# Patient Record
Sex: FEMALE | Race: BLACK OR AFRICAN AMERICAN | NOT HISPANIC OR LATINO | URBAN - METROPOLITAN AREA
[De-identification: names, ages, dates, MRNs, and addresses within clinical notes are randomized per-mention and may not be internally consistent; named-entity substitution may affect disease eponyms.]

---
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
## 2019-02-06 ENCOUNTER — EMERGENCY (EMERGENCY)
Facility: HOSPITAL | Age: 19
LOS: 1 days | Discharge: HOME | End: 2019-02-06
Attending: EMERGENCY MEDICINE | Admitting: EMERGENCY MEDICINE
Payer: MEDICAID

## 2019-02-06 VITALS
TEMPERATURE: 99 F | RESPIRATION RATE: 18 BRPM | HEART RATE: 104 BPM | DIASTOLIC BLOOD PRESSURE: 89 MMHG | OXYGEN SATURATION: 100 % | SYSTOLIC BLOOD PRESSURE: 112 MMHG

## 2019-02-06 VITALS
TEMPERATURE: 98 F | DIASTOLIC BLOOD PRESSURE: 66 MMHG | OXYGEN SATURATION: 100 % | HEART RATE: 86 BPM | SYSTOLIC BLOOD PRESSURE: 110 MMHG | RESPIRATION RATE: 18 BRPM

## 2019-02-06 PROBLEM — F31.4 BIPOLAR DISORDER, CURRENT EPISODE DEPRESSED, SEVERE, WITHOUT PSYCHOTIC FEATURES: Chronic | Status: ACTIVE | Noted: 2017-09-14

## 2019-02-06 LAB
ALBUMIN SERPL ELPH-MCNC: 3.4 G/DL — SIGNIFICANT CHANGE UP (ref 3.3–5)
ALP SERPL-CCNC: 71 U/L — SIGNIFICANT CHANGE UP (ref 40–120)
ALT FLD-CCNC: 8 U/L — SIGNIFICANT CHANGE UP (ref 4–33)
ANION GAP SERPL CALC-SCNC: 11 MMO/L — SIGNIFICANT CHANGE UP (ref 7–14)
ANISOCYTOSIS BLD QL: SLIGHT — SIGNIFICANT CHANGE UP
AST SERPL-CCNC: 12 U/L — SIGNIFICANT CHANGE UP (ref 4–32)
BASOPHILS # BLD AUTO: 0.03 K/UL — SIGNIFICANT CHANGE UP (ref 0–0.2)
BASOPHILS NFR BLD AUTO: 0.3 % — SIGNIFICANT CHANGE UP (ref 0–2)
BASOPHILS NFR SPEC: 0.9 % — SIGNIFICANT CHANGE UP (ref 0–2)
BILIRUB SERPL-MCNC: < 0.2 MG/DL — LOW (ref 0.2–1.2)
BLASTS # FLD: 0 % — SIGNIFICANT CHANGE UP (ref 0–0)
BUN SERPL-MCNC: 9 MG/DL — SIGNIFICANT CHANGE UP (ref 7–23)
CALCIUM SERPL-MCNC: 9.1 MG/DL — SIGNIFICANT CHANGE UP (ref 8.4–10.5)
CHLORIDE SERPL-SCNC: 103 MMOL/L — SIGNIFICANT CHANGE UP (ref 98–107)
CO2 SERPL-SCNC: 24 MMOL/L — SIGNIFICANT CHANGE UP (ref 22–31)
CREAT SERPL-MCNC: 0.66 MG/DL — SIGNIFICANT CHANGE UP (ref 0.5–1.3)
EOSINOPHIL # BLD AUTO: 0.13 K/UL — SIGNIFICANT CHANGE UP (ref 0–0.5)
EOSINOPHIL NFR BLD AUTO: 1.3 % — SIGNIFICANT CHANGE UP (ref 0–6)
EOSINOPHIL NFR FLD: 0 % — SIGNIFICANT CHANGE UP (ref 0–6)
GIANT PLATELETS BLD QL SMEAR: PRESENT — SIGNIFICANT CHANGE UP
GLUCOSE SERPL-MCNC: 110 MG/DL — HIGH (ref 70–99)
HCT VFR BLD CALC: 23.8 % — LOW (ref 34.5–45)
HGB BLD-MCNC: 7.3 G/DL — LOW (ref 11.5–15.5)
HYPOCHROMIA BLD QL: SIGNIFICANT CHANGE UP
IMM GRANULOCYTES NFR BLD AUTO: 0.5 % — SIGNIFICANT CHANGE UP (ref 0–1.5)
LIDOCAIN IGE QN: 46.7 U/L — SIGNIFICANT CHANGE UP (ref 7–60)
LYMPHOCYTES # BLD AUTO: 2.16 K/UL — SIGNIFICANT CHANGE UP (ref 1–3.3)
LYMPHOCYTES # BLD AUTO: 21.4 % — SIGNIFICANT CHANGE UP (ref 13–44)
LYMPHOCYTES NFR SPEC AUTO: 21.4 % — SIGNIFICANT CHANGE UP (ref 13–44)
MCHC RBC-ENTMCNC: 18.8 PG — LOW (ref 27–34)
MCHC RBC-ENTMCNC: 30.7 % — LOW (ref 32–36)
MCV RBC AUTO: 61.2 FL — LOW (ref 80–100)
METAMYELOCYTES # FLD: 0 % — SIGNIFICANT CHANGE UP (ref 0–1)
MICROCYTES BLD QL: SLIGHT — SIGNIFICANT CHANGE UP
MONOCYTES # BLD AUTO: 0.52 K/UL — SIGNIFICANT CHANGE UP (ref 0–0.9)
MONOCYTES NFR BLD AUTO: 5.1 % — SIGNIFICANT CHANGE UP (ref 2–14)
MONOCYTES NFR BLD: 0.9 % — LOW (ref 2–9)
MYELOCYTES NFR BLD: 0 % — SIGNIFICANT CHANGE UP (ref 0–0)
NEUTROPHIL AB SER-ACNC: 76.8 % — SIGNIFICANT CHANGE UP (ref 43–77)
NEUTROPHILS # BLD AUTO: 7.22 K/UL — SIGNIFICANT CHANGE UP (ref 1.8–7.4)
NEUTROPHILS NFR BLD AUTO: 71.4 % — SIGNIFICANT CHANGE UP (ref 43–77)
NEUTS BAND # BLD: 0 % — SIGNIFICANT CHANGE UP (ref 0–6)
NRBC # FLD: 0 K/UL — LOW (ref 25–125)
OB PNL STL: NEGATIVE — SIGNIFICANT CHANGE UP
OTHER - HEMATOLOGY %: 0 — SIGNIFICANT CHANGE UP
OVALOCYTES BLD QL SMEAR: SLIGHT — SIGNIFICANT CHANGE UP
PLATELET # BLD AUTO: 639 K/UL — HIGH (ref 150–400)
PLATELET COUNT - ESTIMATE: SIGNIFICANT CHANGE UP
PMV BLD: 9 FL — SIGNIFICANT CHANGE UP (ref 7–13)
POIKILOCYTOSIS BLD QL AUTO: SLIGHT — SIGNIFICANT CHANGE UP
POTASSIUM SERPL-MCNC: 3.8 MMOL/L — SIGNIFICANT CHANGE UP (ref 3.5–5.3)
POTASSIUM SERPL-SCNC: 3.8 MMOL/L — SIGNIFICANT CHANGE UP (ref 3.5–5.3)
PROMYELOCYTES # FLD: 0 % — SIGNIFICANT CHANGE UP (ref 0–0)
PROT SERPL-MCNC: 6.7 G/DL — SIGNIFICANT CHANGE UP (ref 6–8.3)
RBC # BLD: 3.89 M/UL — SIGNIFICANT CHANGE UP (ref 3.8–5.2)
RBC # FLD: 18.5 % — HIGH (ref 10.3–14.5)
SODIUM SERPL-SCNC: 138 MMOL/L — SIGNIFICANT CHANGE UP (ref 135–145)
TARGETS BLD QL SMEAR: SIGNIFICANT CHANGE UP
VARIANT LYMPHS # BLD: 0 % — SIGNIFICANT CHANGE UP
WBC # BLD: 10.11 K/UL — SIGNIFICANT CHANGE UP (ref 3.8–10.5)
WBC # FLD AUTO: 10.11 K/UL — SIGNIFICANT CHANGE UP (ref 3.8–10.5)

## 2019-02-06 PROCEDURE — 76705 ECHO EXAM OF ABDOMEN: CPT | Mod: 26

## 2019-02-06 PROCEDURE — 99284 EMERGENCY DEPT VISIT MOD MDM: CPT | Mod: 25

## 2019-02-06 RX ORDER — IBUPROFEN 200 MG
600 TABLET ORAL ONCE
Qty: 0 | Refills: 0 | Status: COMPLETED | OUTPATIENT
Start: 2019-02-06 | End: 2019-02-06

## 2019-02-06 RX ORDER — ONDANSETRON 8 MG/1
4 TABLET, FILM COATED ORAL ONCE
Qty: 0 | Refills: 0 | Status: COMPLETED | OUTPATIENT
Start: 2019-02-06 | End: 2019-02-06

## 2019-02-06 RX ORDER — FAMOTIDINE 10 MG/ML
20 INJECTION INTRAVENOUS ONCE
Qty: 0 | Refills: 0 | Status: COMPLETED | OUTPATIENT
Start: 2019-02-06 | End: 2019-02-06

## 2019-02-06 RX ADMIN — FAMOTIDINE 20 MILLIGRAM(S): 10 INJECTION INTRAVENOUS at 04:48

## 2019-02-06 RX ADMIN — Medication 600 MILLIGRAM(S): at 07:45

## 2019-02-06 RX ADMIN — ONDANSETRON 4 MILLIGRAM(S): 8 TABLET, FILM COATED ORAL at 04:48

## 2019-02-06 RX ADMIN — Medication 30 MILLILITER(S): at 04:48

## 2019-02-06 NOTE — ED PROVIDER NOTE - ATTENDING CONTRIBUTION TO CARE
HPI: 17 y/o F with history of depression presents with complaint of RUQ abdominal pain intermittently x one week. Pain currently 8/10, sharp, non radiating, no associated nausea/vomiting. Patient was evaluated at [facility] for same pain one week ago and had normal CT. Pain is not exacerbated or improved with eating. Denies fever, chills, chest pain, SOB, diarrhea, dysuria, increased frequency. LMP one week ago.  EXAM: uncomfortable appearing, no guarding but tenderness to palpation in epigastrium, Neg Nicholson, neg mcburneys, lungs ctab.   MDM: concern for gastritis vs PUD, unlikely gallstones or AAA or MI/ACS. Will obtain labs, imaging, provide meds and reassess. HPI: 19 y/o F with history of depression presents with complaint of RUQ abdominal pain intermittently x one week. Pain currently 8/10, sharp, non radiating, no associated nausea/vomiting. Patient was evaluated at [facility] for same pain one week ago and had normal CT. Pain is not exacerbated or improved with eating. Denies fever, chills, chest pain, SOB, diarrhea, dysuria, increased frequency. LMP one week ago. Pt denies SI/HI at this time. Reviewed due to chart review showing previous SI/s with OD of meds. But denies this time. No tylenol ingestions.   EXAM: uncomfortable appearing, no guarding but tenderness to palpation in epigastrium, Neg [place]ier, neg mcburneys, lungs ctab.   MDM: concern for gastritis vs PUD, unlikely gallstones or AAA or MI/ACS. Will obtain labs, imaging, provide meds and reassess.

## 2019-02-06 NOTE — ED ADULT NURSE NOTE - CHIEF COMPLAINT QUOTE
Pt BIBA c/o RUQ x 10 days.  went to OSH and was given ABX for UTI??  Pt with headphones listening to music.  PT NAD.  LMP 1 week ago.

## 2019-02-06 NOTE — ED PROVIDER NOTE - NSFOLLOWUPINSTRUCTIONS_ED_ALL_ED_FT
Follow up with your PMD within 48-72 hours.  Also recommend follow up with gastroenterology - referral list given. Rest, increase fluids. Take tylenol 650mg every 6 hours for pain or temp greater than 99.9. Take Pepcid 20mg 1 tab 2x/day for 1-2 days as needed for epigastric burning.  Eat bland, small meals as tolerated.  Worsening or continued fever, chills, weakness, nausea, vomiting, abdominal pain return to ER

## 2019-02-06 NOTE — ED PROVIDER NOTE - OBJECTIVE STATEMENT
19 y/o F with history of depression presents with complaint of RUQ abdominal pain intermittently x one week. Pain currently 8/10, sharp, non radiating, no associated nausea/vomiting. Patient was evaluated at [facility] for same pain one week ago and had normal CT. Pain is not exacerbated or improved with eating. Denies fever, chills, chest pain, SOB, diarrhea, dysuria, increased frequency. LMP one week ago.

## 2019-02-06 NOTE — ED PROVIDER NOTE - PROGRESS NOTE DETAILS
BALDERRAMA: labs shows Hb 7.7 which is lower than previous. Pt has Hb C trait already known. Will send guiaic to see if PUD or GIB. If neg then can discharge home if pain improved to f/u with PMD and GI MD. KEVIN BAINS - pt notes improvement of abdominal pain with motrin, abdomen soft, nontender, stable for dc with gi and pmd f/u

## 2019-02-06 NOTE — ED ADULT TRIAGE NOTE - CHIEF COMPLAINT QUOTE
Pt BIBA c/o RUQ x 10 days.  went to OSH and was given ABX for UTI??  PT NAD.  LMP 1 week ago. Pt BIBA c/o RUQ x 10 days.  went to OSH and was given ABX for UTI??  Pt with headphones listening to music.  PT NAD.  LMP 1 week ago.

## 2019-02-06 NOTE — ED PROVIDER NOTE - MEDICAL DECISION MAKING DETAILS
19 y/o F with RUQ abd pain intermittently x one week. tenderness in RUQ. concern for lenora. labs, US, pain control, reassess

## 2019-04-22 NOTE — DISCHARGE NOTE ADULT - PHYSICIAN SECTION COMPLETE
Called patient and she is trying to establish at a clinic closer to her home at the [facility]. She is requesting that I call her back later so she has some time to decide what she wants to do.   Yes

## 2019-06-04 NOTE — H&P ADULT - GEN GEN HX ROS MEA POS PC
10lb weight loss over the past month/weight loss/fatigue/chills

## 2020-12-17 NOTE — ED ADULT NURSE NOTE - NS PRO PASSIVE SMOKE EXP
Parent left  requesting a refill of Adderall XR 25 mg. Parent states this medication is working well for Luca.      The [place] ePDMP has been checked by me as your delegate and is consistent with our medication record in Epic.     Last Refill: 11/17/20    Last Medication Check: 9/15/20      Routed to PCP for review.    Unknown

## 2021-10-20 NOTE — PATIENT PROFILE ADULT. - SPIRITUAL CULTURAL, RELIGIOUS PRACTICES/VALUES, PROFILE
n/a Cosentyx Counseling:  I discussed with the patient the risks of Cosentyx including but not limited to worsening of Crohn's disease, immunosuppression, allergic reactions and infections.  The patient understands that monitoring is required including a PPD at baseline and must alert us or the primary physician if symptoms of infection or other concerning signs are noted.

## 2022-05-04 NOTE — ED ADULT NURSE NOTE - PT NEEDS ASSIST
Patient here today for nurse blood pressure check. Last dose of BP medication taken Amlodipine on 5/4 in the morning. BP Readings from Last 1 Encounters:   05/04/22 1429 (!) 156/78   05/04/22 1416 (!) 144/78       Last visit for this condition: 11/23/21  Last visit BP Reading 194/108  Per that visit plan of care: Lisinopril 20 mg daily  Next visit with PCP scheduled for: 5/16/22  Current blood pressure medications are: Lisinopril 20mg and Amlodipine 5mg    Please review and advise on plan of care. Pulse Readings from Last 1 Encounters:   04/26/22 85       Patient states he takes the Lisinopril in the evening and the Amlodipine in the morning. Patient denied any symptoms including headaches, dizzy, or lightheadedness. no

## 2022-06-01 NOTE — ED PROVIDER NOTE - CADM POA PRESS ULCER
Qbrexza Counseling:  I discussed with the patient the risks of Qbrexza including but not limited to headache, mydriasis, blurred vision, dry eyes, nasal dryness, dry mouth, dry throat, dry skin, urinary hesitation, and constipation.  Local skin reactions including erythema, burning, stinging, and itching can also occur. No

## 2022-09-13 ENCOUNTER — EMERGENCY (EMERGENCY)
Facility: HOSPITAL | Age: 22
LOS: 0 days | Discharge: HOME | End: 2022-09-13
Attending: STUDENT IN AN ORGANIZED HEALTH CARE EDUCATION/TRAINING PROGRAM

## 2022-09-13 VITALS — SYSTOLIC BLOOD PRESSURE: 112 MMHG | DIASTOLIC BLOOD PRESSURE: 82 MMHG | HEART RATE: 63 BPM

## 2022-09-13 VITALS — WEIGHT: 119.93 LBS | HEIGHT: 65 IN

## 2022-09-13 DIAGNOSIS — R19.7 DIARRHEA, UNSPECIFIED: ICD-10-CM

## 2022-09-13 DIAGNOSIS — R11.2 NAUSEA WITH VOMITING, UNSPECIFIED: ICD-10-CM

## 2022-09-13 DIAGNOSIS — R10.30 LOWER ABDOMINAL PAIN, UNSPECIFIED: ICD-10-CM

## 2022-09-13 DIAGNOSIS — R55 SYNCOPE AND COLLAPSE: ICD-10-CM

## 2022-09-13 LAB
ALBUMIN SERPL ELPH-MCNC: 3.6 G/DL — SIGNIFICANT CHANGE UP (ref 3.3–5)
ALP SERPL-CCNC: 70 U/L — SIGNIFICANT CHANGE UP (ref 40–120)
ALT FLD-CCNC: 16 U/L — SIGNIFICANT CHANGE UP (ref 12–78)
ANION GAP SERPL CALC-SCNC: 8 MMOL/L — SIGNIFICANT CHANGE UP (ref 5–17)
APPEARANCE UR: ABNORMAL
AST SERPL-CCNC: 30 U/L — SIGNIFICANT CHANGE UP (ref 15–37)
BACTERIA # UR AUTO: ABNORMAL
BASOPHILS # BLD AUTO: 0.02 K/UL — SIGNIFICANT CHANGE UP (ref 0–0.2)
BASOPHILS NFR BLD AUTO: 0.4 % — SIGNIFICANT CHANGE UP (ref 0–2)
BILIRUB SERPL-MCNC: 0.4 MG/DL — SIGNIFICANT CHANGE UP (ref 0.2–1.2)
BILIRUB UR-MCNC: NEGATIVE — SIGNIFICANT CHANGE UP
BUN SERPL-MCNC: 11 MG/DL — SIGNIFICANT CHANGE UP (ref 7–23)
CALCIUM SERPL-MCNC: 8.6 MG/DL — SIGNIFICANT CHANGE UP (ref 8.5–10.1)
CHLORIDE SERPL-SCNC: 110 MMOL/L — HIGH (ref 96–108)
CO2 SERPL-SCNC: 23 MMOL/L — SIGNIFICANT CHANGE UP (ref 22–31)
COLOR SPEC: YELLOW — SIGNIFICANT CHANGE UP
COMMENT - URINE: SIGNIFICANT CHANGE UP
CREAT SERPL-MCNC: 0.68 MG/DL — SIGNIFICANT CHANGE UP (ref 0.5–1.3)
DIFF PNL FLD: ABNORMAL
EGFR: 125 ML/MIN/1.73M2 — SIGNIFICANT CHANGE UP
EOSINOPHIL # BLD AUTO: 0.09 K/UL — SIGNIFICANT CHANGE UP (ref 0–0.5)
EOSINOPHIL NFR BLD AUTO: 1.9 % — SIGNIFICANT CHANGE UP (ref 0–6)
GLUCOSE SERPL-MCNC: 89 MG/DL — SIGNIFICANT CHANGE UP (ref 70–99)
GLUCOSE UR QL: NEGATIVE MG/DL — SIGNIFICANT CHANGE UP
HCG SERPL-ACNC: <1 MIU/ML — SIGNIFICANT CHANGE UP
HCT VFR BLD CALC: 29.5 % — LOW (ref 34.5–45)
HGB BLD-MCNC: 9.4 G/DL — LOW (ref 11.5–15.5)
HIV 1 & 2 AB SERPL IA.RAPID: SIGNIFICANT CHANGE UP
HYPOCHROMIA BLD QL: SLIGHT — SIGNIFICANT CHANGE UP
IMM GRANULOCYTES NFR BLD AUTO: 0.2 % — SIGNIFICANT CHANGE UP (ref 0–1.5)
KETONES UR-MCNC: ABNORMAL
LACTATE SERPL-SCNC: 1 MMOL/L — SIGNIFICANT CHANGE UP (ref 0.7–2)
LEUKOCYTE ESTERASE UR-ACNC: NEGATIVE — SIGNIFICANT CHANGE UP
LIDOCAIN IGE QN: 208 U/L — SIGNIFICANT CHANGE UP (ref 73–393)
LYMPHOCYTES # BLD AUTO: 1.77 K/UL — SIGNIFICANT CHANGE UP (ref 1–3.3)
LYMPHOCYTES # BLD AUTO: 38.2 % — SIGNIFICANT CHANGE UP (ref 13–44)
MANUAL SMEAR VERIFICATION: SIGNIFICANT CHANGE UP
MCHC RBC-ENTMCNC: 21.4 PG — LOW (ref 27–34)
MCHC RBC-ENTMCNC: 31.9 G/DL — LOW (ref 32–36)
MCV RBC AUTO: 67 FL — LOW (ref 80–100)
MICROCYTES BLD QL: SLIGHT — SIGNIFICANT CHANGE UP
MONOCYTES # BLD AUTO: 0.29 K/UL — SIGNIFICANT CHANGE UP (ref 0–0.9)
MONOCYTES NFR BLD AUTO: 6.3 % — SIGNIFICANT CHANGE UP (ref 2–14)
NEUTROPHILS # BLD AUTO: 2.45 K/UL — SIGNIFICANT CHANGE UP (ref 1.8–7.4)
NEUTROPHILS NFR BLD AUTO: 53 % — SIGNIFICANT CHANGE UP (ref 43–77)
NITRITE UR-MCNC: NEGATIVE — SIGNIFICANT CHANGE UP
NRBC # BLD: 0 /100 WBCS — SIGNIFICANT CHANGE UP (ref 0–0)
PH UR: 5 — SIGNIFICANT CHANGE UP (ref 5–8)
PLAT MORPH BLD: NORMAL — SIGNIFICANT CHANGE UP
PLATELET # BLD AUTO: 428 K/UL — HIGH (ref 150–400)
POTASSIUM SERPL-MCNC: 4.3 MMOL/L — SIGNIFICANT CHANGE UP (ref 3.5–5.3)
POTASSIUM SERPL-SCNC: 4.3 MMOL/L — SIGNIFICANT CHANGE UP (ref 3.5–5.3)
PROT SERPL-MCNC: 7.6 GM/DL — SIGNIFICANT CHANGE UP (ref 6–8.3)
PROT UR-MCNC: 30 MG/DL
RBC # BLD: 4.4 M/UL — SIGNIFICANT CHANGE UP (ref 3.8–5.2)
RBC # FLD: 20.1 % — HIGH (ref 10.3–14.5)
RBC BLD AUTO: SIGNIFICANT CHANGE UP
RBC CASTS # UR COMP ASSIST: ABNORMAL /HPF (ref 0–4)
SODIUM SERPL-SCNC: 141 MMOL/L — SIGNIFICANT CHANGE UP (ref 135–145)
SP GR SPEC: 1.02 — SIGNIFICANT CHANGE UP (ref 1.01–1.02)
TROPONIN I, HIGH SENSITIVITY RESULT: 4.1 NG/L — SIGNIFICANT CHANGE UP
UROBILINOGEN FLD QL: NEGATIVE MG/DL — SIGNIFICANT CHANGE UP
WBC # BLD: 4.63 K/UL — SIGNIFICANT CHANGE UP (ref 3.8–10.5)
WBC # FLD AUTO: 4.63 K/UL — SIGNIFICANT CHANGE UP (ref 3.8–10.5)
WBC UR QL: NEGATIVE — SIGNIFICANT CHANGE UP

## 2022-09-13 PROCEDURE — 71045 X-RAY EXAM CHEST 1 VIEW: CPT | Mod: 26

## 2022-09-13 PROCEDURE — 99285 EMERGENCY DEPT VISIT HI MDM: CPT

## 2022-09-13 PROCEDURE — 74177 CT ABD & PELVIS W/CONTRAST: CPT | Mod: 26,MC

## 2022-09-13 PROCEDURE — 70450 CT HEAD/BRAIN W/O DYE: CPT | Mod: 26,MC

## 2022-09-13 PROCEDURE — 93010 ELECTROCARDIOGRAM REPORT: CPT

## 2022-09-13 RX ORDER — SODIUM CHLORIDE 9 MG/ML
1000 INJECTION INTRAMUSCULAR; INTRAVENOUS; SUBCUTANEOUS ONCE
Refills: 0 | Status: COMPLETED | OUTPATIENT
Start: 2022-09-13 | End: 2022-09-13

## 2022-09-13 RX ORDER — SACCHAROMYCES BOULARDII 250 MG
1 POWDER IN PACKET (EA) ORAL
Qty: 5 | Refills: 0
Start: 2022-09-13 | End: 2022-09-17

## 2022-09-13 RX ORDER — ONDANSETRON 8 MG/1
4 TABLET, FILM COATED ORAL ONCE
Refills: 0 | Status: COMPLETED | OUTPATIENT
Start: 2022-09-13 | End: 2022-09-13

## 2022-09-13 RX ORDER — ONDANSETRON 8 MG/1
1 TABLET, FILM COATED ORAL
Qty: 10 | Refills: 0
Start: 2022-09-13 | End: 2022-09-17

## 2022-09-13 RX ORDER — FAMOTIDINE 10 MG/ML
20 INJECTION INTRAVENOUS ONCE
Refills: 0 | Status: COMPLETED | OUTPATIENT
Start: 2022-09-13 | End: 2022-09-13

## 2022-09-13 RX ADMIN — SODIUM CHLORIDE 1000 MILLILITER(S): 9 INJECTION INTRAMUSCULAR; INTRAVENOUS; SUBCUTANEOUS at 13:06

## 2022-09-13 RX ADMIN — ONDANSETRON 4 MILLIGRAM(S): 8 TABLET, FILM COATED ORAL at 13:06

## 2022-09-13 RX ADMIN — FAMOTIDINE 20 MILLIGRAM(S): 10 INJECTION INTRAVENOUS at 13:06

## 2022-09-13 NOTE — ED ADULT TRIAGE NOTE - CHIEF COMPLAINT QUOTE
pt a&O x4 ambulatory from home, pt c.o  diarrhea x 1 weeks. LMP now . pt c.o of lower abd pain states she had a unwitnessed syncopal episode ":fainted" last night at home.

## 2022-09-13 NOTE — ED PROVIDER NOTE - CLINICAL SUMMARY MEDICAL DECISION MAKING FREE TEXT BOX
Otherwise healthy 22F pw 1wk of N/V/D, lower abd pain a/w episode unwitnessed syncope this AM. AF, VSS. Well appearing, in NAD. Plan: CBC, CMP, lipase, lactate, HCG, UA/C, CT brain / AP, trop, ECG, CXR. Give IVF, Zofran, Pepcid. Re-eval.

## 2022-09-13 NOTE — ED ADULT NURSE NOTE - OBJECTIVE STATEMENT
Pt AOx4, responsive, ambulatory w/o assistive device. Pt c/o syncopal episode at 2am today and lost consciousness till 8am. States nausea & vomiting multiple episodes for days. Pt AOx4, responsive, ambulatory w/o assistive device. Pt c/o syncopal episode at 2am today and lost consciousness till 8am. States dizziness, nausea & vomiting and diarrhea multiple episodes for days. denies blood in vomit, fever/chills, SOB. no PMH per pt. NKA. LMP 9/13/22.

## 2022-09-13 NOTE — ED PROVIDER NOTE - PROGRESS NOTE DETAILS
W/u neg, stable for d/c, requesting STI testing, will send. W/u w/o significant abnormalities. On re-eval, resting comfortably, in NAD. Pt requesting STI testing, will send. Stable for d/c home. Given scripts for Zofran, Florastor. Instructed for close outpatient PCP f/u. Return signs / symptoms d/w pt. She understands / agrees w/ this plan.

## 2022-09-13 NOTE — ED PROVIDER NOTE - OBJECTIVE STATEMENT
22F presents d/t N/V/D, lower abd pain x 1wk a/w episode unwitnessed syncope last night. Accompanied by sister. Pt reports ate meal of chinese food 1wk ago, w/ onset N/V/D, thought 2/2 food poisoning. However symptoms have persisted, and pt reports now soiling herself w/ BMs. Pt went to [facility] 5 days ago and was told WBC was 'very low,' however pt left prior to completing ED w/u, w/o imaging studies. Pt reports on phone w/ friend at 0200, and believes she passed out b/c she awoke on the ground 0800 this AM. Pt reports friend told her he had heard 'thump' over the phone. Pt reports able to tolerate PO liquids, but vomiting w/ PO solids. Denies F/C, h/a, dizziness, CP, SOB, flank pain, black or bloody BMs, UTI sx, LE pain / swelling.     PMH none, PSH none, NKDA, no meds, LMP currently.

## 2022-09-13 NOTE — ED ADULT NURSE NOTE - NS TRANSFER PATIENT BELONGINGS
Cell Phone/PDA (specify)/Clothing

## 2022-09-13 NOTE — ED PROVIDER NOTE - PATIENT PORTAL LINK FT
Agree with Dr. Randall's recommendation You can access the FollowMyHealth Patient Portal offered by [facility] by registering at the following website: [email or web address]. By joining [facility]’s FollowMyHealth portal, you will also be able to view your health information using other applications (apps) compatible with our system. You can access the FollowMyHealth Patient Portal offered by [facility] by registering at the following website: [email or web address]. By joining [employer]’s FollowMyHealth portal, you will also be able to view your health information using other applications (apps) compatible with our system.

## 2022-09-13 NOTE — ED ADULT NURSE NOTE - NSICDXFAMILYHX_GEN_ALL_CORE_FT
FAMILY HISTORY:  Father  Still living? No  Family history of HIV infection, Age at diagnosis: Age Unknown    Mother  Still living? Yes, Estimated age: Age Unknown  Family history of essential hypertension, Age at diagnosis: Age Unknown     Patient : Rafael Stein Age: 55 year old Sex: male   MRN: [id number] Encounter Date: 2/5/2018      History     Chief Complaint   Patient presents with   • Numbness     HPI  2A/B02A  2/5/2018  5:47 PM Rafael Stein is a 55 year old male with a recent L shoulder surgery who was sent to the ED by his PCP for c/o left facial and LUE numbness that began yesterday. He noticed a L arm numbness in the morning and took a nap. When he woke up around 12:00 PM he noticed the facial numbness and his kids noticed a left facial droop. The pt's LKWT is 12:00 PM yesterday. He describes the facial numbness is on his forehead and the Left side of his face but bilateral forehead. The pt had a nerve release surgery 5 days ago on shoulder. He additionally has hx of cellulitis on his left arm. Additional sx include intermittent subjective fever that has been treated with Tylenol. His last Tylenol dose was around 1:00 PM. The pt denies weakness, pertinent vision changes or any other associated sx. There are no further complaints or modifying factors at this time.    PCP: Susan Albrecht MD     Allergies   Allergen Reactions   • Contrast Media Nausea & Vomiting     Nausea and vomiting is a sensitivity not a allergy. May need anti nausea meds   • Cortisone Other (See Comments)     Metallic taste in mouth for several weeks after injection        Prior to Admission Medications    ACETAMINOPHEN (TYLENOL) 650 MG CR TABLET    Take 650 mg by mouth every 8 hours as needed for Pain.    CEFADROXIL (DURICEF) 500 MG CAPSULE    Take 1 capsule by mouth 2 times daily.    DIPHENHYDRAMINE (BENADRYL) 25 MG CAPSULE    Take 1 capsule by mouth every 4 hours as needed for Itching.    FUROSEMIDE (LASIX) 20 MG TABLET    Take 1 tablet by mouth daily.    HYDROCODONE-ACETAMINOPHEN (NORCO) 5-325 MG PER TABLET    Take 1 tablet by mouth every 6 hours as needed for Pain.    LACTOBACILLUS ACIDOPHILUS (BACID)    Take 1 tablet by mouth 2 times daily.    LACTULOSE  (CHRONULAC) 10 GM/15ML SOLUTION    Take 15 mLs by mouth 3 times daily.    LIDOCAINE (LIDODERM) 5 % PATCH    Place 1 patch onto the skin nightly. Remove patch 12 hours after applying    MAGNESIUM LACTATE (MAG-TAB SR) 84 MG (7MEQ) TAB CR    Take 84 mg by mouth daily (with breakfast).     MENTHOL-METHYL SALICYLATE ANALGESIC (ICY HOT, MENTHODERM) OINTMENT OINTMENT    Apply topically every 8 hours as needed for Pain.    NICOTINE (NICODERM) 14 MG/24HR PATCH    Place 1 patch onto the skin daily.    PROPRANOLOL (INDERAL) 10 MG TABLET    Take 1 tablet by mouth 2 times daily.    TIZANIDINE (ZANAFLEX) 4 MG CAPSULE    Take 1 capsule by mouth 3 times daily as needed for Muscle spasms.    VITAMIN - THERAPEUTIC MULTIVITAMINS W/MINERALS (CENTRUM SILVER,THERA-M) TAB    Take 1 tablet by mouth daily.       Past Medical History:   Diagnosis Date   • Anxiety    • Arthritis     JADEN shoulders, left more than right.   • Chronic pain     left shoulder pain   • Essential (primary) hypertension    • Fracture 06/15/2017    , right arm and wrist   • Gastroesophageal reflux disease    • Left shoulder pain    • Liver disease     Hep C./ cirrhosis has not started treatment   • Moderate protein-calorie malnutrition (CMS/HCC)    • Sepsis (CMS/HCC) 12/07/2017    resolved, etiology unknown       Past Surgical History:   Procedure Laterality Date   • FASCIOTOMY Left 07/2017    cellulitis, left upper extremity   • FRACTURE SURGERY Left 06/2017    ORIF ankle   • JOINT REPLACEMENT     • ORTHOPEDIC SURGERY  [id number]    numerous surgeries on rt arm, work injury, rt wrist fused   • TONSILLECTOMY         Family History   Problem Relation Age of Onset   • Heart disease Father    • Stroke Brother    • Cancer Brother        Social History   Substance Use Topics   • Smoking status: Current Every Day Smoker     Packs/day: 0.25     Years: 40.00     Types: Cigarettes   • Smokeless tobacco: Never Used      Comment: using nicotine patch prn , working on quitting, 1  -2 cig/day   • Alcohol use Yes      Comment: socially        Review of Systems   Constitutional: Positive for fever. Negative for chills.   HENT: Negative for congestion and sore throat.    Eyes: Negative for visual disturbance.   Respiratory: Negative for cough and shortness of breath.    Cardiovascular: Negative for chest pain and leg swelling.   Gastrointestinal: Negative for abdominal pain, diarrhea, nausea and vomiting.   Genitourinary: Negative for dysuria and hematuria.   Skin: Negative for rash.   Neurological: Positive for numbness (L facial and LLE ). Negative for weakness and headaches.        Positive for L facial droop  Negative for vision changes     Hematological: Does not bruise/bleed easily.       Physical Exam     ED Triage Vitals [02/05/18 6844]   ED Triage Vitals Group      Temp 99.1 °F (37.3 °C)      Pulse 70      Resp 16      BP (!) 170/93      SpO2 99 %      EtCO2 mmHg       Height 6' (1.829 m)      Weight 188 lb 6.4 oz (85.5 kg)      Weight Scale Used ED Actual       Physical Exam   Constitutional: He appears well-developed and well-nourished.   HENT:   Head: Atraumatic.   Eyes: EOM are normal. Pupils are equal, round, and reactive to light.   Neck: Normal range of motion.   Cardiovascular: Normal rate and regular rhythm.    Pulses:       Carotid pulses are on the right side with bruit.  Pulmonary/Chest: Breath sounds normal. No respiratory distress. He has no wheezes.   Abdominal: Soft. There is no tenderness.   Musculoskeletal: He exhibits no edema.        Neurological: He is alert.   Subjective decrease sensation on the L face and R forehead, LUE and LLE  Left facial droop   Strength grossly intact and symmetrical    Skin: Skin is warm and dry.   Incision site on L posterior shoulder is sclean, dry and no erythema     Psychiatric: He has a normal mood and affect.   Nursing note and vitals reviewed.    ED Course     Procedures    Lab Results     Results for orders placed or performed during  the hospital encounter of 02/05/18   CBC & Auto Differential   Result Value Ref Range    WBC 5.4 4.2 - 11.0 K/mcL    RBC 3.72 (L) 4.50 - 5.90 mil/mcL    HGB 12.8 (L) 13.0 - 17.0 g/dL    HCT 36.6 (L) 39.0 - 51.0 %    MCV 98.4 78.0 - 100.0 fl    MCH 34.4 (H) 26.0 - 34.0 pg    MCHC 35.0 32.0 - 36.5 g/dL    RDW-CV 14.2 11.0 - 15.0 %     140 - 450 K/mcL    DIFF TYPE AUTOMATED DIFFERENTIAL     Neutrophil 39 %    LYMPH 41 %    MONO 7 %    EOSIN 11 %    BASO 2 %    Absolute Neutrophil 2.1 1.8 - 7.7 K/mcL    Absolute Lymph 2.2 1.0 - 4.0 K/mcL    Absolute Mono 0.4 0.3 - 0.9 K/mcL    Absolute Eos 0.6 (H) 0.1 - 0.5 K/mcL    Absolute Baso 0.1 0.0 - 0.3 K/mcL   Prothrombin Time   Result Value Ref Range    PROTIME 13.6 (H) 9.7 - 11.8 sec    INR 1.3    Comprehensive Metabolic Panel   Result Value Ref Range    Sodium 140 135 - 145 mmol/L    Potassium 3.7 3.4 - 5.1 mmol/L    Chloride 103 98 - 107 mmol/L    Carbon Dioxide 30 21 - 32 mmol/L    Anion Gap 11 10 - 20 mmol/L    Glucose 92 65 - 99 mg/dL    BUN 7 6 - 20 mg/dL    Creatinine 0.74 0.67 - 1.17 mg/dL    GFR Estimate,  >90     GFR Estimate, Non African American >90     BUN/Creatinine Ratio 9 7 - 25    CALCIUM 8.5 8.4 - 10.2 mg/dL    TOTAL BILIRUBIN 1.7 (H) 0.2 - 1.0 mg/dL    AST/SGOT 83 (H) <38 Units/L    ALT/SGPT 40 <79 Units/L    ALK PHOSPHATASE 131 (H) 45 - 117 Units/L    TOTAL PROTEIN 8.7 (H) 6.4 - 8.2 g/dL    Albumin 3.0 (L) 3.6 - 5.1 g/dL    GLOBULIN 5.7 (H) 2.0 - 4.0 g/dL    A/G Ratio, Serum 0.5 (L) 1.0 - 2.4   Chem 8 Panel - Point of Care   Result Value Ref Range    Sodium  135 - 145 mmol/L    Potassium POC 3.8 3.4 - 5.1 mmol/L    Chloride  98 - 107 mmol/L    CALCIUM IONIZED-POC 1.17 1.15 - 1.29 mmol/L    CO2 Total 29 (H) 19 - 24 mmol/L    GLUCOSE POC 95 65 - 99 mg/dL    BUN POC 6 6 - 20 mg/dL    HEMATOCRIT POC 39.0 39.0 - 51.0 %    Hemoglobin POC 13.3 13.0 - 17.0 g/dL    ANION GAP POC 17 mmol/L    Creatinine POC 0.80 0.67 - 1.17 mg/dL     Estimated GFR  (POC) >90     Estimated GFR Non- (POC) >90    Troponin I - Point of Care   Result Value Ref Range    Troponin I POC <0.10 <0.10 ng/mL       EKG Results     EKG Interpretation  Rate: 70  Rhythm: normal sinus rhythm   Abnormality: No significant abnormality, Unchanged from previous ECG    EKG interpreted by ED physician    Radiology Results     Imaging Results          CT Head Brain (Final result)  Result time 02/05/18 18:48:40    Final result                 Impression:    Impression:    Negative CT scan of the head.               Narrative:    CT HEAD WO CONTRAST    CLINICAL INDICATION: STROKE. Left facial and left upper extremity numbness  that began yesterday.    TECHNIQUE: Axial CT images were obtained through the head without the  administration of intravenous contrast. Sagittal and coronal reformations  were performed by the technologist and submitted to the radiologist for  review.     COMPARISON: None.    FINDINGS:    No evidence for intracranial hemorrhage or abnormal extra-axial fluid  collection. There is no evidence of mass or mass effect and the ventricular  system is midline, without evidence for hydrocephalus.  Size and  configuration of the ventricles and sulci are considered within normal  limits for age. The gray-white matter differentiation pattern is preserved.  There is no CT evidence of acute ischemic infarction. The basal cisterns  are patent. Allowing for beam hardening artifact, no abnormalities  identified in the posterior fossa or brainstem.      The calvarium is intact. The visualized paranasal sinuses and mastoid air  cells are clear.                                 ED Medication Orders     Start Ordered     Status Ordering Provider    02/05/18 2100 02/05/18 1757  sodium chloride (PF) 0.9 % injection 2 mL  (Capped IV)  2 times per day      ANDRADE Lunsford    02/05/18 1756 02/05/18 1757  sodium chloride (PF) 0.9 % injection 2  mL  (Capped IV)  PRN      Last MAR action:  Given EMEKA FRASER          [facility]  Vitals  Vitals:    02/05/18 2010 02/05/18 2012 02/05/18 2023 02/05/18 2053   BP:  (!) 163/116 (!) 176/99 (!) 185/95   Pulse: 63  66 62   Resp: 14  30 20   Temp:    97.8 °F (36.6 °C)   TempSrc:    Oral   SpO2: 98%  98% 99%   Weight:       Height:           ED Course  6:51 PM RN update  The pt has a NIH of 4 and a pass    8:05 PM I spoke with Dr. Simone Leonardo, Internal Medicine, regarding the patient's presentation in the ED. The pt had surgery on his left shoulder and comes in c/o L facial and LUE numbness. He has suspected L facial droop and per PEx he additionally has LLE numbness. His NIH score was 4 and a pass. There is no evidence of infection. Dr. Leonardo did not want the pt to be taken to a different facility for further care. We agreed on a plan of care.     8:11 PM I have rechecked the pt.  We discussed the CT showing no signs of stroke at the moment. His lab work up was unremarkable. He reports his L shoulder pain has been getting worse after the surgery. We discussed the plan of care for further care in the hospital for a stroke workup. The pt understands the plan of care. All further questions are answered.       Critical Care time spent on this patient outside of billable procedures:  None    Clinical Impression:  ED Diagnoses        Final diagnoses    Stroke-like symptoms Left facial droop and left sided numbness    Post-op pain               Pt to be admitted to Dr. Leonardo in stable condition.      I have reviewed the information recorded by the scribe for accuracy and agree with its contents.    ____________________________________________________________________    Dara Browne acting as a scribe for Dr. Emeka Fraser  Dictation # [id number]  Scribe: Dara Fraser MD  02/06/18 0204

## 2022-09-14 LAB
C TRACH RRNA SPEC QL NAA+PROBE: SIGNIFICANT CHANGE UP
N GONORRHOEA RRNA SPEC QL NAA+PROBE: SIGNIFICANT CHANGE UP
T PALLIDUM AB TITR SER: NEGATIVE — SIGNIFICANT CHANGE UP

## 2022-09-15 LAB
CULTURE RESULTS: SIGNIFICANT CHANGE UP
SPECIMEN SOURCE: SIGNIFICANT CHANGE UP

## 2023-03-10 NOTE — BEHAVIORAL HEALTH ASSESSMENT NOTE - HOMICIDALITY / AGGRESSION (CURRENT/PAST)
[facility] [place]  [place]    Subjective:     Patient ID:  Nikolay Barraza is a 82 y.o. male patient here for evaluation Results (ECHO . On Sunday 02/26/2023 patient has a funny feeling in his chest . He had light headed and weakness. /So he went to [facility] in [facility] . )      HPI:  82-year-old male here for follow-up of his shortness of breath.  In the past he is had mildly elevated BNP for which we tried diuretics on him but it has not improved his shortness of breath.  He had a normal stress test last year.  Continues to report shortness of breath with chest discomfort on minimal exertion.    Review of Systems   All other systems reviewed and are negative.     Past Medical History:   Diagnosis Date    Allergy     Codeine    Closed fracture dislocation of lumbar spine 7/6/2018    #2 vertebra. Patient went to the emergency Department.  Followup with Erik:    GERD (gastroesophageal reflux disease)     Hyperlipidemia     Hypertension     Hypertensive retinopathy of both eyes 5/11/2021    As per ophthalmology notes.  Eye care 20/20    Right inguinal hernia     Thyroid disease        Past Surgical History:   Procedure Laterality Date    ADENOIDECTOMY      INGUINAL HERNIA REPAIR Right 07/12/2018    Dr. [facility]    KNEE SURGERY Left 07/31/2019    TONSILLECTOMY      VASECTOMY         Family History   Problem Relation Age of Onset    Heart disease Father     Miscarriages / Stillbirths Father     Skin cancer Mother     Cancer Maternal Grandfather     Stomach cancer Paternal Grandfather        Social History     Socioeconomic History    Marital status:      Spouse name: Alisa barraza    Number of children: 3   Occupational History    Occupation: [employer]-      Comment:     Tobacco Use    Smoking status: Never    Smokeless tobacco: Never   Substance and Sexual Activity    Alcohol use:  No    Drug use: No    Sexual activity: Not Currently     Partners: Female     Social Determinants of Health     Financial Resource Strain: Low Risk     Difficulty of Paying Living Expenses: Not very hard   Food Insecurity: No Food Insecurity    Worried About Running Out of Food in the Last Year: Never true    Ran Out of Food in the Last Year: Never true   Transportation Needs: No Transportation Needs    Lack of Transportation (Medical): No    Lack of Transportation (Non-Medical): No   Physical Activity: Inactive    Days of Exercise per Week: 0 days    Minutes of Exercise per Session: 0 min   Stress: Stress Concern Present    Feeling of Stress : To some extent   Social Connections: Socially Integrated    Frequency of Communication with Friends and Family: More than three times a week    Frequency of Social Gatherings with Friends and Family: Three times a week    Attends Gnosticism Services: More than 4 times per year    Active Member of Clubs or Organizations: Yes    Attends Club or Organization Meetings: 1 to 4 times per year    Marital Status:    Housing Stability: Low Risk     Unable to Pay for Housing in the Last Year: No    Number of Places Lived in the Last Year: 1    Unstable Housing in the Last Year: No       Current Outpatient Medications   Medication Sig Dispense Refill    ammonium lactate 12 % Crea Apply 1 application topically 2 (two) times daily. 140 g 11    ascorbic acid, vitamin C, (VITAMIN C) 500 MG tablet Take 500 mg by mouth once daily.      aspirin (ECOTRIN) 81 MG EC tablet Take 81 mg by mouth once daily.      b complex vitamins tablet Take 1 tablet by mouth once daily.      CALCIUM CARBONATE/VITAMIN D3 (VITAMIN D-3 ORAL) Take by mouth.      docusate sodium (COLACE) 50 MG capsule Take by mouth 2 (two) times daily as needed for Constipation.      doxycycline (ORACEA) 40 mg capsule TAKE 1 CAPSULE BY MOUTH EVERY DAY 90 capsule 3    finasteride (PROSCAR) 5 mg tablet TAKE 1 TABLET BY MOUTH EVERY  DAY 90 tablet 1    fish oil-omega-3 fatty acids 300-1,000 mg capsule Take 2 g by mouth once daily.      fluocinonide (LIDEX) 0.05 % ointment Apply to AA lower leg bid max use one month 45 g 0    furosemide (LASIX) 20 MG tablet Take 1 tablet (20 mg total) by mouth once daily. 90 tablet 3    levothyroxine (SYNTHROID, LEVOTHROID) 175 MCG tablet Take 1 tablet (175 mcg total) by mouth before breakfast. 90 tablet 3    losartan (COZAAR) 25 MG tablet Take 1 tablet (25 mg total) by mouth 2 (two) times a day. 180 tablet 3    multivitamin (THERAGRAN) per tablet Take 1 tablet by mouth once daily.      mupirocin (BACTROBAN) 2 % ointment by Nasal route 3 (three) times daily. 20 g 0    nitroGLYCERIN (NITROSTAT) 0.4 MG SL tablet Place 0.4 mg under the tongue every 5 (five) minutes as needed for Chest pain.      pravastatin (PRAVACHOL) 20 MG tablet TAKE 1 TABLET BY MOUTH EVERY DAY 90 tablet 2    tamsulosin (FLOMAX) 0.4 mg Cap Take 1 capsule (0.4 mg total) by mouth once daily. 90 capsule 0    triamcinolone acetonide 0.1% (KENALOG) 0.1 % cream Apply topically 2 (two) times daily. For rash on legs. 80 g 1     No current facility-administered medications for this visit.       Review of patient's allergies indicates:   Allergen Reactions    Codeine Other (See Comments)     mood changes  Makes him feel violent         Objective:        Vitals:    03/10/23 1133   BP: 130/68   Pulse: 68       Physical Exam  Vitals reviewed.   Constitutional:       Appearance: Normal appearance.   HENT:      Mouth/Throat:      Mouth: Mucous membranes are moist.   Eyes:      Extraocular Movements: Extraocular movements intact.      Pupils: Pupils are equal, round, and reactive to light.   Cardiovascular:      Rate and Rhythm: Normal rate and regular rhythm.      Pulses: Normal pulses.      Heart sounds: Normal heart sounds. No murmur heard.    No gallop.   Pulmonary:      Effort: Pulmonary effort is normal.      Breath sounds: Normal breath sounds.    Abdominal:      General: Bowel sounds are normal.      Palpations: Abdomen is soft.   Musculoskeletal:         General: Normal range of motion.      Cervical back: Normal range of motion.   Skin:     General: Skin is warm and dry.   Neurological:      General: No focal deficit present.      Mental Status: He is alert and oriented to person, place, and time.   Psychiatric:         Mood and Affect: Mood normal.       LIPIDS - LAST 2   Lab Results   Component Value Date    CHOL 157 12/27/2020    CHOL 169 12/17/2020    HDL 37 (L) 12/27/2020    HDL 41 12/17/2020    LDLCALC 93.6 12/27/2020    LDLCALC 108.4 12/17/2020    TRIG 132 12/27/2020    TRIG 98 12/17/2020    CHOLHDL 23.6 12/27/2020    CHOLHDL 24.3 12/17/2020       CBC - LAST 2  Lab Results   Component Value Date    WBC 5.40 03/10/2023    WBC 4.76 04/07/2022    RBC 5.04 03/10/2023    RBC 4.47 (L) 04/07/2022    HGB 14.5 03/10/2023    HGB 12.7 (L) 04/07/2022    HCT 43.7 03/10/2023    HCT 38.7 (L) 04/07/2022    MCV 87 03/10/2023    MCV 87 04/07/2022    MCH 28.8 03/10/2023    MCH 28.4 04/07/2022    MCHC 33.2 03/10/2023    MCHC 32.8 04/07/2022    RDW 14.0 03/10/2023    RDW 14.3 04/07/2022     03/10/2023     04/07/2022    MPV 9.9 03/10/2023    MPV 9.1 (L) 04/07/2022    GRAN 3.1 03/10/2023    GRAN 57.4 03/10/2023    LYMPH 1.5 03/10/2023    LYMPH 28.5 03/10/2023    MONO 0.6 03/10/2023    MONO 10.2 03/10/2023    BASO 0.04 03/10/2023    BASO 0.05 04/07/2022    NRBC 0 03/10/2023    NRBC 0 04/07/2022       CHEMISTRY & LIVER FUNCTION - LAST 2  Lab Results   Component Value Date     03/10/2023     01/27/2023    K 4.8 03/10/2023    K 4.7 01/27/2023     03/10/2023     01/27/2023    CO2 28 03/10/2023    CO2 28 01/27/2023    ANIONGAP 8 03/10/2023    ANIONGAP 8 01/27/2023    BUN 23 03/10/2023    BUN 25 (H) 01/27/2023    CREATININE 1.3 03/10/2023    CREATININE 1.3 01/27/2023    GLU 98 03/10/2023     01/27/2023    CALCIUM 9.4 03/10/2023     CALCIUM 9.5 01/27/2023    MG 2.1 07/18/2022    MG 1.9 07/20/2021    ALBUMIN 4.0 01/17/2023    ALBUMIN 4.2 07/18/2022    PROT 7.9 12/26/2020    PROT 7.3 12/17/2020    ALKPHOS 98 12/26/2020    ALKPHOS 75 12/17/2020    ALT 24 12/26/2020    ALT 20 12/17/2020    AST 34 12/26/2020    AST 24 12/17/2020    BILITOT 0.7 12/26/2020    BILITOT 0.9 12/17/2020        CARDIAC PROFILE - LAST 2  Lab Results   Component Value Date    BNP 68 01/27/2023     (H) 04/07/2022    CPK 32 12/27/2020    CPK 37 12/26/2020    CPKMB 0.6 12/27/2020    CPKMB 0.9 12/26/2020    TROPONINI <0.006 12/27/2020    TROPONINI <0.006 12/27/2020        COAGULATION - LAST 2  Lab Results   Component Value Date    INR 1.0 12/26/2020    APTT 27.3 12/26/2020       ENDOCRINE & PSA - LAST 2  Lab Results   Component Value Date    HGBA1C 5.8 08/22/2017    TSH 2.060 09/22/2022    TSH 0.290 (L) 04/28/2022    PSA 0.11 06/28/2019    PSA 0.1 05/22/2018        ECHOCARDIOGRAM RESULTS  Results for orders placed during the hospital encounter of 02/23/23    Echo    Interpretation Summary  · The left ventricle is normal in size with mild concentric hypertrophy and normal systolic function.  · The estimated ejection fraction is 65%.  · Indeterminate left ventricular diastolic function.  · Normal right ventricular size with normal right ventricular systolic function.  · Mild left atrial enlargement.  · Normal central venous pressure (3 mmHg).  · The estimated PA systolic pressure is 17 mmHg.      CURRENT/PREVIOUS VISIT EKG  Results for orders placed or performed in visit on 01/27/23   IN OFFICE EKG 12-LEAD (to [place])    Collection Time: 01/27/23  9:49 AM    Narrative    Test Reason : I10,    Vent. Rate : 057 BPM     Atrial Rate : 057 BPM     P-R Int : 208 ms          QRS Dur : 088 ms      QT Int : 432 ms       P-R-T Axes : 067 -35 081 degrees     QTc Int : 420 ms    Sinus bradycardia  Left axis deviation  Abnormal ECG  When compared with ECG of 14-SEP-2022 09:00,  No  significant change was found  Confirmed by Dima Vergara MD ([id number]) on 2/18/2023 12:37:48 PM    Referred By:             Confirmed By:Dima Vergara MD     No valid procedures specified.   Results for orders placed in visit on 06/21/22    Nuclear Stress Test    Interpretation Summary    The nuclear portion of this study will be reported separately.    The EKG portion of this study is negative for ischemia.    The patient reported chest pain during the stress test.    There were no arrhythmias during stress.    No valid procedures specified.        Assessment:       1. Shortness of breath    2. Mixed hyperlipidemia    3. Paroxysmal atrial fibrillation    4. Chest discomfort             Plan:       Shortness of breath  -     Full code; Standing  -     Case Request-Cath Lab: Angiogram, Coronary, with Left Heart Cath; Standing  -     Height and weight; Standing  -     Insert 2 peripheral IVs; Standing  -     Void; Standing  -     Clip and Prep Right Radial, Right Groin, Left Groin; Standing  -     Verify Modification of Diabetic Agents; Standing  -     Verify informed consent; Standing  -     Vital signs; Standing  -     Cardiac Monitoring - Adult; Standing  -     Pulse Oximetry Q4H; Standing  -     Diet NPO; Standing  -     CBC auto differential; Standing  -     Basic metabolic panel; Standing  -     EKG 12-lead; Standing  -     Ambulatory referral/consult to Pulmonology; Future; Expected date: 03/17/2023  -     Basic metabolic panel; Future; Expected date: 03/10/2023  -     CBC Auto Differential; Future; Expected date: 03/10/2023    Mixed hyperlipidemia    Paroxysmal atrial fibrillation    Chest discomfort    Other orders  -     0.9%  NaCl infusion  -     diphenhydrAMINE capsule 50 mg      Given significant risk factors of coronary artery disease and severe symptoms, will evaluate further with a diagnostic angiogram.  Risks and benefits of the procedure were discussed with the patient including pain, bleeding,  infection, contrast induced nephropathy, vascular damage, stroke, heart attack and rare chance of death.  Discussed with him the need for dual antiplatelet therapy if he gets a PCI.  Patient verbalized understanding and would like to proceed with the procedure.    Discussed with patient regarding his atrial fibrillation.  Dr. Velasquez had recommended loop recorder at his last visit to monitor the AFib but patient declined to do so.  Discussed with him again and his family, they might reconsider it after the angiogram.    Follow up in about 4 weeks (around 4/7/2023) for f/u coroanry angiogram.          MD Vent[facility] [place] [facility]   None known in lifetime

## 2023-06-17 NOTE — ED PROVIDER NOTE - EKG ADDITIONAL QUESTION - PERFORMED INDEPENDENT VISUALIZATION
ANNALISA Note: ANNALISA was informed by meli LADD that pt cleared to return back home but needs transportation. ANNALISA reached out to [employer] and spoke to Melissa. Melissa confirmed pt could return via medicaid taxi and provided fx#[phone] to send over clinicals and dc paperwork. ANNALISA confirmed pt's address with Melissa. ANNALISA called [employer] ( [employer] ) and spoke to Eileen. Eileen was able to set up transportation RESV#[id number] but was not able to provide a time but informed ANNALISA it could take between 30min - 3hrs. ANNALISA later called [employer] back and spoke to Marielle - Marielle confirmed at this time the ride was still pending and no time was set yet. ANNALISA shortly received a call back from [employer] ( [facility] ) and ANNALISA confirmed pt is ambulatory - ANNALISA was informed the ride should be set within the next hr. ANNALISA faxed over clinicals to [employer]. ANNALISA will follow. ANNALISA Note: ANNALISA was informed by psych MD that pt cleared to return back home but needs transportation. ANNALISA reached out to [employer] and spoke to Melissa. Melissa confirmed pt could return via medicaid taxi and provided fx#[phone] to send over clinicals and dc paperwork. ANNALISA confirmed pt's address with Melissa. ANNALISA called [employer] and spoke to Eileen. Eileen was able to set up transportation RESV#[id number] but was not able to provide a time but informed ANNALISA it could take between 30min - 3hrs. ANNALISA later called [employer] back and spoke to Marielle - Marielle confirmed at this time the ride was still pending and no time was set yet. ANNALISA shortly received a call back from [employer] and ANNALISA confirmed pt is ambulatory - ANNALISA was informed the ride should be set within the next hr. ANNALISA faxed over clinicals to [employer]. ANNALISA called [employer] again - [place] informed ANNALISA ride was set with [facility] service at 16:30. No other SW needs at this time. Yes

## 2025-08-14 NOTE — ED BEHAVIORAL HEALTH ASSESSMENT NOTE - NS ED BHA MED ROS ENT MOUTH
[FreeTextEntry1] : Ms Magana is a 21 year old  at 3 months pregnant presenting with left nephrolithiasis  - renal US, follow up pending results - UA and culture - obtain records from Dr. Tavon Mcdonnell - Discussed possible etiologies for nephrolithiasis. Reviewed behavioral modifications including adequate hydration, cutting back on coffee, dark sodas, dark teas, low sodium diet, increasing citrate levels with lemon juice.  - Discussed importance of seeking medical attention should intractable flank pain with nausea, vomiting or fever occur  No complaints